# Patient Record
Sex: MALE | Race: WHITE
[De-identification: names, ages, dates, MRNs, and addresses within clinical notes are randomized per-mention and may not be internally consistent; named-entity substitution may affect disease eponyms.]

---

## 2019-12-17 ENCOUNTER — HOSPITAL ENCOUNTER (OUTPATIENT)
Dept: HOSPITAL 35 - HYPER | Age: 74
End: 2019-12-17
Attending: SPECIALIST
Payer: COMMERCIAL

## 2019-12-17 DIAGNOSIS — E11.51: ICD-10-CM

## 2019-12-17 DIAGNOSIS — Z79.84: ICD-10-CM

## 2019-12-17 DIAGNOSIS — R21: ICD-10-CM

## 2019-12-17 DIAGNOSIS — I50.9: ICD-10-CM

## 2019-12-17 DIAGNOSIS — E11.39: ICD-10-CM

## 2019-12-17 DIAGNOSIS — E11.43: ICD-10-CM

## 2019-12-17 DIAGNOSIS — I48.91: ICD-10-CM

## 2019-12-17 DIAGNOSIS — I13.0: ICD-10-CM

## 2019-12-17 DIAGNOSIS — L97.811: ICD-10-CM

## 2019-12-17 DIAGNOSIS — I25.10: ICD-10-CM

## 2019-12-17 DIAGNOSIS — G47.30: ICD-10-CM

## 2019-12-17 DIAGNOSIS — I87.2: ICD-10-CM

## 2019-12-17 DIAGNOSIS — F17.200: ICD-10-CM

## 2019-12-17 DIAGNOSIS — F19.10: ICD-10-CM

## 2019-12-17 DIAGNOSIS — R60.0: ICD-10-CM

## 2019-12-17 DIAGNOSIS — L97.821: ICD-10-CM

## 2019-12-17 DIAGNOSIS — K21.9: ICD-10-CM

## 2019-12-17 DIAGNOSIS — I87.333: ICD-10-CM

## 2019-12-17 DIAGNOSIS — N18.4: ICD-10-CM

## 2019-12-17 DIAGNOSIS — E11.22: ICD-10-CM

## 2019-12-17 DIAGNOSIS — L30.9: ICD-10-CM

## 2019-12-17 DIAGNOSIS — Z79.01: ICD-10-CM

## 2019-12-17 DIAGNOSIS — E11.622: Primary | ICD-10-CM

## 2019-12-23 ENCOUNTER — HOSPITAL ENCOUNTER (OUTPATIENT)
Dept: HOSPITAL 61 - PCVCIMAG | Age: 74
Discharge: HOME | End: 2019-12-23
Attending: FAMILY MEDICINE
Payer: MEDICARE

## 2019-12-23 DIAGNOSIS — I87.2: Primary | ICD-10-CM

## 2019-12-23 PROCEDURE — 93925 LOWER EXTREMITY STUDY: CPT

## 2019-12-23 PROCEDURE — 93970 EXTREMITY STUDY: CPT

## 2019-12-23 NOTE — PCVCIMAG
EXAM: BILATERAL SUPERFICIAL VENOUS DUPLEX



INDICATION: Leg pain and swelling.



FINDINGS: 



Right leg: No thrombus in the common femoral, main femoral, or

popliteal veins. These veins are compressible.



Right Great Saphenous Vein: At the saphenofemoral junction the

diameter is 11.9 mm, in the mid thigh it is 7.7 mm, and in the calf it

is 7.2 mm. There is significant venous insufficiency/reflux

throughout. Venous insufficiency/reflux duration is 3.5 seconds.



Right Small Saphenous Vein: At the saphenopopliteal junction the

diameter is 3.2 mm, and in the calf it is 3.9 mm. There is not

significant venous insufficiency/reflux throughout. Venous

insufficiency/reflux duration is 0 seconds. There is not a cranial

extension present.



Left leg: No thrombus in the common femoral, main femoral, or

popliteal veins. These veins are compressible.



Left Great Saphenous Vein: At the saphenofemoral junction the diameter

is 12.8 mm, in the mid thigh it is 9.7 mm, and in the calf it is 6.4

mm. There is significant venous insufficiency/reflux throughout.

Venous insufficiency/reflux duration is 2.9 seconds.



Left Small Saphenous Vein: At the saphenopopliteal junction the

diameter is 5.2 mm, and in the calf it is 4.6 mm. There is not

significant venous insufficiency/reflux throughout. Venous

insufficiency/reflux duration is 0 seconds. There is not a cranial

extension present.



IMPRESSION: 

Right Great Saphenous Vein: Significant  venous insufficiency/reflux

is present as noted above.  

Right Small Saphenous Vein: No significant venous insufficiency/reflux

is present as noted above.  

Left Great Saphenous Vein: Significant  venous insufficiency/reflux is

present as noted above.  

Left Small Saphenous Vein: No significant venous insufficiency/reflux

is present as noted above.  



Incidental note is made of venous insufficiency/reflux in the right

and left popliteal vein with reflux duration up to 1.2 seconds.







LOC:[id number]

## 2019-12-23 NOTE — PCVCIMAG
EXAM: BILATERAL LOWER EXTREMITY ARTERIAL DUPLEX



INDICATION: Peripheral Arterial Disease. Leg pain.



FINDINGS: 

Right Leg: Common femoral and profunda femoral arteries are patent. 

Superficial femoral artery and popliteal artery are patent.  70%

stenosis mid anterior tibial artery.  Peroneal artery is patent. 

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



Left Leg:  Common femoral and profunda femoral arteries are patent. 

Superficial femoral artery and popliteal artery are patent.  The

anterior tibial and peroneal arteries are patent.  Occlusion of the

distal posterior tibial artery.



IMPRESSION: 

70% stenosis mid right anterior tibial artery.

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

Occlusion of the distal left posterior tibial artery.  Otherwise no

flow limiting stenosis in the left lower extremity.





LOC:[id number]

## 2020-01-06 ENCOUNTER — HOSPITAL ENCOUNTER (OUTPATIENT)
Dept: HOSPITAL 35 - HYPER | Age: 75
End: 2020-01-06
Attending: EMERGENCY MEDICINE
Payer: COMMERCIAL

## 2020-01-06 DIAGNOSIS — E11.622: Primary | ICD-10-CM

## 2020-01-06 DIAGNOSIS — L97.821: ICD-10-CM

## 2020-01-06 DIAGNOSIS — F17.210: ICD-10-CM

## 2020-01-06 DIAGNOSIS — E11.22: ICD-10-CM

## 2020-01-06 DIAGNOSIS — R21: ICD-10-CM

## 2020-01-06 DIAGNOSIS — E11.43: ICD-10-CM

## 2020-01-06 DIAGNOSIS — I50.9: ICD-10-CM

## 2020-01-06 DIAGNOSIS — E11.51: ICD-10-CM

## 2020-01-06 DIAGNOSIS — G47.30: ICD-10-CM

## 2020-01-06 DIAGNOSIS — M19.90: ICD-10-CM

## 2020-01-06 DIAGNOSIS — I25.10: ICD-10-CM

## 2020-01-06 DIAGNOSIS — L97.811: ICD-10-CM

## 2020-01-06 DIAGNOSIS — Z79.01: ICD-10-CM

## 2020-01-06 DIAGNOSIS — I83.218: ICD-10-CM

## 2020-01-06 DIAGNOSIS — L30.9: ICD-10-CM

## 2020-01-06 DIAGNOSIS — I83.228: ICD-10-CM

## 2020-01-06 DIAGNOSIS — R60.0: ICD-10-CM

## 2020-01-06 DIAGNOSIS — K21.9: ICD-10-CM

## 2020-01-06 DIAGNOSIS — I13.0: ICD-10-CM

## 2020-01-06 DIAGNOSIS — Z79.84: ICD-10-CM

## 2020-01-06 DIAGNOSIS — N18.4: ICD-10-CM

## 2020-01-06 DIAGNOSIS — I48.91: ICD-10-CM

## 2020-02-10 ENCOUNTER — HOSPITAL ENCOUNTER (OUTPATIENT)
Dept: HOSPITAL 35 - HYPER | Age: 75
End: 2020-02-10
Attending: EMERGENCY MEDICINE
Payer: COMMERCIAL

## 2020-02-10 DIAGNOSIS — E11.51: ICD-10-CM

## 2020-02-10 DIAGNOSIS — G47.30: ICD-10-CM

## 2020-02-10 DIAGNOSIS — F17.290: ICD-10-CM

## 2020-02-10 DIAGNOSIS — Z79.84: ICD-10-CM

## 2020-02-10 DIAGNOSIS — R21: ICD-10-CM

## 2020-02-10 DIAGNOSIS — I48.91: ICD-10-CM

## 2020-02-10 DIAGNOSIS — I50.9: ICD-10-CM

## 2020-02-10 DIAGNOSIS — E11.43: ICD-10-CM

## 2020-02-10 DIAGNOSIS — Z79.01: ICD-10-CM

## 2020-02-10 DIAGNOSIS — M19.90: ICD-10-CM

## 2020-02-10 DIAGNOSIS — I25.10: ICD-10-CM

## 2020-02-10 DIAGNOSIS — E11.622: ICD-10-CM

## 2020-02-10 DIAGNOSIS — I70.248: ICD-10-CM

## 2020-02-10 DIAGNOSIS — L97.811: ICD-10-CM

## 2020-02-10 DIAGNOSIS — L30.9: ICD-10-CM

## 2020-02-10 DIAGNOSIS — L97.821: ICD-10-CM

## 2020-02-10 DIAGNOSIS — I70.238: ICD-10-CM

## 2020-02-10 DIAGNOSIS — N18.5: ICD-10-CM

## 2020-02-10 DIAGNOSIS — I87.333: Primary | ICD-10-CM

## 2020-02-10 DIAGNOSIS — E11.22: ICD-10-CM

## 2020-02-10 DIAGNOSIS — K21.9: ICD-10-CM

## 2020-02-10 DIAGNOSIS — I13.2: ICD-10-CM

## 2021-09-01 ENCOUNTER — HOSPITAL ENCOUNTER (OUTPATIENT)
Dept: HOSPITAL 35 - HYPER | Age: 76
End: 2021-09-01
Attending: PREVENTIVE MEDICINE
Payer: COMMERCIAL

## 2021-09-01 DIAGNOSIS — E87.5: ICD-10-CM

## 2021-09-01 DIAGNOSIS — R18.8: ICD-10-CM

## 2021-09-01 DIAGNOSIS — I25.10: ICD-10-CM
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
## 2021-09-01 DIAGNOSIS — E11.22: ICD-10-CM

## 2021-09-01 DIAGNOSIS — I89.0: ICD-10-CM
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
## 2021-09-01 DIAGNOSIS — I13.0: ICD-10-CM

## 2021-09-01 DIAGNOSIS — E11.39: ICD-10-CM

## 2021-09-01 DIAGNOSIS — Z90.49: ICD-10-CM

## 2021-09-01 DIAGNOSIS — I48.91: ICD-10-CM

## 2021-09-01 DIAGNOSIS — G47.00: ICD-10-CM

## 2021-09-01 DIAGNOSIS — S41.111A: ICD-10-CM

## 2021-09-01 DIAGNOSIS — S51.812A: Primary | ICD-10-CM

## 2021-09-01 DIAGNOSIS — Z98.49: ICD-10-CM

## 2021-09-01 DIAGNOSIS — G47.30: ICD-10-CM

## 2021-09-01 DIAGNOSIS — I83.90: ICD-10-CM

## 2021-09-01 DIAGNOSIS — S61.412A: ICD-10-CM

## 2021-09-01 DIAGNOSIS — L29.8: ICD-10-CM

## 2021-09-01 DIAGNOSIS — K76.0: ICD-10-CM

## 2021-09-01 DIAGNOSIS — E11.51: ICD-10-CM

## 2021-09-01 DIAGNOSIS — K74.60: ICD-10-CM

## 2021-09-01 DIAGNOSIS — Z87.898: ICD-10-CM

## 2021-09-01 DIAGNOSIS — F17.200: ICD-10-CM
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
## 2021-09-01 DIAGNOSIS — E11.649: ICD-10-CM

## 2021-09-01 DIAGNOSIS — K21.9: ICD-10-CM

## 2021-09-01 DIAGNOSIS — R09.89: ICD-10-CM

## 2021-09-01 DIAGNOSIS — Z96.651: ICD-10-CM

## 2021-09-01 DIAGNOSIS — S41.112A: ICD-10-CM

## 2021-09-01 DIAGNOSIS — S51.811A: ICD-10-CM

## 2021-09-01 DIAGNOSIS — E11.43: ICD-10-CM

## 2021-09-01 DIAGNOSIS — M19.90: ICD-10-CM

## 2021-09-01 DIAGNOSIS — N18.4: ICD-10-CM

## 2021-09-01 DIAGNOSIS — R42: ICD-10-CM

## 2021-09-01 DIAGNOSIS — E55.9: ICD-10-CM

## 2021-09-01 DIAGNOSIS — L89.893: ICD-10-CM
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
## 2021-09-01 DIAGNOSIS — I48.21: ICD-10-CM

## 2021-09-01 DIAGNOSIS — I50.9: ICD-10-CM
